# Patient Record
Sex: MALE
[De-identification: names, ages, dates, MRNs, and addresses within clinical notes are randomized per-mention and may not be internally consistent; named-entity substitution may affect disease eponyms.]

---

## 2020-12-10 PROBLEM — Z00.00 ENCOUNTER FOR PREVENTIVE HEALTH EXAMINATION: Status: ACTIVE | Noted: 2020-12-10

## 2020-12-14 ENCOUNTER — APPOINTMENT (OUTPATIENT)
Dept: ORTHOPEDIC SURGERY | Facility: CLINIC | Age: 30
End: 2020-12-14
Payer: COMMERCIAL

## 2020-12-14 VITALS — BODY MASS INDEX: 20.54 KG/M2 | WEIGHT: 155 LBS | HEIGHT: 73 IN

## 2020-12-14 DIAGNOSIS — Z78.9 OTHER SPECIFIED HEALTH STATUS: ICD-10-CM

## 2020-12-14 DIAGNOSIS — Z82.61 FAMILY HISTORY OF ARTHRITIS: ICD-10-CM

## 2020-12-14 DIAGNOSIS — M54.6 PAIN IN THORACIC SPINE: ICD-10-CM

## 2020-12-14 DIAGNOSIS — Z87.898 PERSONAL HISTORY OF OTHER SPECIFIED CONDITIONS: ICD-10-CM

## 2020-12-14 DIAGNOSIS — M54.2 CERVICALGIA: ICD-10-CM

## 2020-12-14 PROCEDURE — 99072 ADDL SUPL MATRL&STAF TM PHE: CPT

## 2020-12-14 PROCEDURE — 72040 X-RAY EXAM NECK SPINE 2-3 VW: CPT

## 2020-12-14 PROCEDURE — 99204 OFFICE O/P NEW MOD 45 MIN: CPT

## 2020-12-14 PROCEDURE — 72070 X-RAY EXAM THORAC SPINE 2VWS: CPT

## 2020-12-14 RX ORDER — MELOXICAM 15 MG/1
15 TABLET ORAL
Qty: 30 | Refills: 1 | Status: ACTIVE | COMMUNITY
Start: 2020-12-14 | End: 1900-01-01

## 2020-12-14 RX ORDER — PERAMPANEL 8 MG/1
TABLET ORAL
Refills: 0 | Status: ACTIVE | COMMUNITY

## 2020-12-14 RX ORDER — LACOSAMIDE 200 MG/1
TABLET, FILM COATED ORAL
Refills: 0 | Status: ACTIVE | COMMUNITY

## 2020-12-14 RX ORDER — TOPIRAMATE 50 MG/1
TABLET, COATED ORAL
Refills: 0 | Status: ACTIVE | COMMUNITY

## 2021-07-29 ENCOUNTER — INPATIENT (INPATIENT)
Facility: HOSPITAL | Age: 31
LOS: 1 days | Discharge: HOME | DRG: 101 | End: 2021-07-31
Attending: PSYCHIATRY & NEUROLOGY | Admitting: PSYCHIATRY & NEUROLOGY
Payer: COMMERCIAL

## 2021-07-29 VITALS
RESPIRATION RATE: 17 BRPM | DIASTOLIC BLOOD PRESSURE: 78 MMHG | SYSTOLIC BLOOD PRESSURE: 116 MMHG | HEIGHT: 72 IN | TEMPERATURE: 98 F | OXYGEN SATURATION: 98 % | HEART RATE: 71 BPM

## 2021-07-29 DIAGNOSIS — G40.919 EPILEPSY, UNSPECIFIED, INTRACTABLE, WITHOUT STATUS EPILEPTICUS: ICD-10-CM

## 2021-07-29 LAB
ALBUMIN SERPL ELPH-MCNC: 4.4 G/DL — SIGNIFICANT CHANGE UP (ref 3.3–5)
ALP SERPL-CCNC: 87 U/L — SIGNIFICANT CHANGE UP (ref 40–120)
ALT FLD-CCNC: 31 U/L — SIGNIFICANT CHANGE UP (ref 10–45)
ANION GAP SERPL CALC-SCNC: 12 MMOL/L — SIGNIFICANT CHANGE UP (ref 5–17)
AST SERPL-CCNC: 16 U/L — SIGNIFICANT CHANGE UP (ref 10–40)
BILIRUB SERPL-MCNC: 0.2 MG/DL — SIGNIFICANT CHANGE UP (ref 0.2–1.2)
BUN SERPL-MCNC: 18 MG/DL — SIGNIFICANT CHANGE UP (ref 7–23)
CALCIUM SERPL-MCNC: 9.4 MG/DL — SIGNIFICANT CHANGE UP (ref 8.4–10.5)
CHLORIDE SERPL-SCNC: 112 MMOL/L — HIGH (ref 96–108)
CK SERPL-CCNC: 109 U/L — SIGNIFICANT CHANGE UP (ref 30–200)
CO2 SERPL-SCNC: 19 MMOL/L — LOW (ref 22–31)
CREAT SERPL-MCNC: 0.99 MG/DL — SIGNIFICANT CHANGE UP (ref 0.5–1.3)
GLUCOSE SERPL-MCNC: 92 MG/DL — SIGNIFICANT CHANGE UP (ref 70–99)
HCT VFR BLD CALC: 45.3 % — SIGNIFICANT CHANGE UP (ref 39–50)
HGB BLD-MCNC: 15.5 G/DL — SIGNIFICANT CHANGE UP (ref 13–17)
LACTATE SERPL-SCNC: 1 MMOL/L — SIGNIFICANT CHANGE UP (ref 0.7–2)
MCHC RBC-ENTMCNC: 31.6 PG — SIGNIFICANT CHANGE UP (ref 27–34)
MCHC RBC-ENTMCNC: 34.2 GM/DL — SIGNIFICANT CHANGE UP (ref 32–36)
MCV RBC AUTO: 92.4 FL — SIGNIFICANT CHANGE UP (ref 80–100)
NRBC # BLD: 0 /100 WBCS — SIGNIFICANT CHANGE UP (ref 0–0)
PCP SPEC-MCNC: SIGNIFICANT CHANGE UP
PLATELET # BLD AUTO: 200 K/UL — SIGNIFICANT CHANGE UP (ref 150–400)
POTASSIUM SERPL-MCNC: 4.1 MMOL/L — SIGNIFICANT CHANGE UP (ref 3.5–5.3)
POTASSIUM SERPL-SCNC: 4.1 MMOL/L — SIGNIFICANT CHANGE UP (ref 3.5–5.3)
PROT SERPL-MCNC: 7.1 G/DL — SIGNIFICANT CHANGE UP (ref 6–8.3)
RBC # BLD: 4.9 M/UL — SIGNIFICANT CHANGE UP (ref 4.2–5.8)
RBC # FLD: 12.1 % — SIGNIFICANT CHANGE UP (ref 10.3–14.5)
SODIUM SERPL-SCNC: 143 MMOL/L — SIGNIFICANT CHANGE UP (ref 135–145)
WBC # BLD: 6.59 K/UL — SIGNIFICANT CHANGE UP (ref 3.8–10.5)
WBC # FLD AUTO: 6.59 K/UL — SIGNIFICANT CHANGE UP (ref 3.8–10.5)

## 2021-07-29 PROCEDURE — 95720 EEG PHY/QHP EA INCR W/VEEG: CPT

## 2021-07-29 RX ORDER — ENOXAPARIN SODIUM 100 MG/ML
40 INJECTION SUBCUTANEOUS AT BEDTIME
Refills: 0 | Status: DISCONTINUED | OUTPATIENT
Start: 2021-07-29 | End: 2021-07-31

## 2021-07-29 RX ORDER — LACOSAMIDE 50 MG/1
100 TABLET ORAL ONCE
Refills: 0 | Status: DISCONTINUED | OUTPATIENT
Start: 2021-07-29 | End: 2021-07-30

## 2021-07-29 RX ADMIN — Medication 0.3 MILLIGRAM(S): at 21:40

## 2021-07-29 NOTE — H&P ADULT - NSHPPHYSICALEXAM_GEN_ALL_CORE
MS: eyes open, alert, oriented to self, location, date, age, speech clear, fluent with intact naming, repetition, comprehension  CN: PERRL (4mm OD, OS), EOMI no nystagmus, VFF, no facial asymmetry, hearing intact to finger rubbing b/l, tongue protrudes midline  Motor: no drift, intact strength throughout  Sensation: intact to LT throughout  Cerebellar: no dysmetria to FTN b/l  Gait: no gait instability

## 2021-07-29 NOTE — H&P ADULT - ASSESSMENT
29yo RH man with history of seizure disorder since age 18 admitted to EMU for seizure capture and AED optimization.    Impression: potential temporal lobe epilepsy given auras and past GTCs    Plan:  admit to EMU under Dr. Wang  vEEG  seizure and aspiration precautions  hold home AEDs (fycompa 6mg qHS  topamax 100mg bid  vimpat 150mg bid  trileptal 600mg bid)  med rec done  dvt ppx: lovenox    RESCUE:  ativan 1mg IVP for GTC or seizure activity > 3 min  vimpat 100mg IVP if refractory    mgmt d/w Epilepsy Attending Dr. Wang   31yo RH man with history of seizure disorder since age 18 admitted to EMU for seizure capture and AED optimization.    Impression: potential temporal lobe epilepsy given auras and past GTCs    Plan:  admit to EMU under Dr. Wang  vEEG  seizure and aspiration precautions  hold home AEDs (fycompa 6mg qHS  topamax 100mg bid  vimpat 150mg bid  trileptal 600mg bid)  CBC, CMP, topiramate, lacosamide, oxcarbazapine levels  med rec done  dvt ppx: lovenox    RESCUE:  ativan 1mg IVP for GTC or seizure activity > 3 min  vimpat 100mg IVP if refractory    mgmt d/w Epilepsy Attending Dr. Wang

## 2021-07-29 NOTE — H&P ADULT - HISTORY OF PRESENT ILLNESS
29yo RH man with history of seizure disorder since age 18 admitted to EMU for seizure capture and AED optimization. Patient reports that the last time he had an aura of "sense of moving" was a couple of days ago. Has these auras a few times a month. Reports grand mal seizures in the past but has not had them for several years. Reports he does not always take his AEDs on time. 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 Patient reports he feels his thinking and memory have been affected over time. Was vaccinated with Moderna Covid19 vaccine last 6/2021. No recent fevers, chills, sob, cp, dysuria, diarrhea, cough/cold symptoms.     Semiology:   auras - not visual, auditory, but usually a sense of moving/instability  GTC sometimes triggered by fatigue    Home meds:  fycompa 6mg qHS  topamax 100mg bid  vimpat 150mg bid  trileptal 600mg bid  clonidine 0.3mg at bedtime

## 2021-07-30 LAB
ALBUMIN SERPL ELPH-MCNC: 4.1 G/DL — SIGNIFICANT CHANGE UP (ref 3.3–5)
ALP SERPL-CCNC: 83 U/L — SIGNIFICANT CHANGE UP (ref 40–120)
ALT FLD-CCNC: 28 U/L — SIGNIFICANT CHANGE UP (ref 10–45)
ANION GAP SERPL CALC-SCNC: 12 MMOL/L — SIGNIFICANT CHANGE UP (ref 5–17)
AST SERPL-CCNC: 15 U/L — SIGNIFICANT CHANGE UP (ref 10–40)
BILIRUB SERPL-MCNC: 0.3 MG/DL — SIGNIFICANT CHANGE UP (ref 0.2–1.2)
BUN SERPL-MCNC: 19 MG/DL — SIGNIFICANT CHANGE UP (ref 7–23)
CALCIUM SERPL-MCNC: 9.2 MG/DL — SIGNIFICANT CHANGE UP (ref 8.4–10.5)
CHLORIDE SERPL-SCNC: 112 MMOL/L — HIGH (ref 96–108)
CO2 SERPL-SCNC: 18 MMOL/L — LOW (ref 22–31)
COVID-19 SPIKE DOMAIN AB INTERP: POSITIVE
COVID-19 SPIKE DOMAIN ANTIBODY RESULT: >250 U/ML — HIGH
CREAT SERPL-MCNC: 0.89 MG/DL — SIGNIFICANT CHANGE UP (ref 0.5–1.3)
GLUCOSE SERPL-MCNC: 101 MG/DL — HIGH (ref 70–99)
HCT VFR BLD CALC: 46.6 % — SIGNIFICANT CHANGE UP (ref 39–50)
HGB BLD-MCNC: 15.8 G/DL — SIGNIFICANT CHANGE UP (ref 13–17)
MCHC RBC-ENTMCNC: 31.3 PG — SIGNIFICANT CHANGE UP (ref 27–34)
MCHC RBC-ENTMCNC: 33.9 GM/DL — SIGNIFICANT CHANGE UP (ref 32–36)
MCV RBC AUTO: 92.5 FL — SIGNIFICANT CHANGE UP (ref 80–100)
NRBC # BLD: 0 /100 WBCS — SIGNIFICANT CHANGE UP (ref 0–0)
PLATELET # BLD AUTO: 194 K/UL — SIGNIFICANT CHANGE UP (ref 150–400)
POTASSIUM SERPL-MCNC: 3.8 MMOL/L — SIGNIFICANT CHANGE UP (ref 3.5–5.3)
POTASSIUM SERPL-SCNC: 3.8 MMOL/L — SIGNIFICANT CHANGE UP (ref 3.5–5.3)
PROT SERPL-MCNC: 6.8 G/DL — SIGNIFICANT CHANGE UP (ref 6–8.3)
RBC # BLD: 5.04 M/UL — SIGNIFICANT CHANGE UP (ref 4.2–5.8)
RBC # FLD: 12.1 % — SIGNIFICANT CHANGE UP (ref 10.3–14.5)
SARS-COV-2 IGG+IGM SERPL QL IA: >250 U/ML — HIGH
SARS-COV-2 IGG+IGM SERPL QL IA: POSITIVE
SODIUM SERPL-SCNC: 142 MMOL/L — SIGNIFICANT CHANGE UP (ref 135–145)
WBC # BLD: 6.42 K/UL — SIGNIFICANT CHANGE UP (ref 3.8–10.5)
WBC # FLD AUTO: 6.42 K/UL — SIGNIFICANT CHANGE UP (ref 3.8–10.5)

## 2021-07-30 PROCEDURE — 95720 EEG PHY/QHP EA INCR W/VEEG: CPT

## 2021-07-30 RX ORDER — ONDANSETRON 8 MG/1
4 TABLET, FILM COATED ORAL ONCE
Refills: 0 | Status: COMPLETED | OUTPATIENT
Start: 2021-07-30 | End: 2021-07-30

## 2021-07-30 RX ADMIN — LACOSAMIDE 100 MILLIGRAM(S): 50 TABLET ORAL at 21:09

## 2021-07-30 RX ADMIN — Medication 0.3 MILLIGRAM(S): at 22:00

## 2021-07-30 RX ADMIN — ONDANSETRON 4 MILLIGRAM(S): 8 TABLET, FILM COATED ORAL at 21:10

## 2021-07-30 NOTE — PROGRESS NOTE ADULT - ASSESSMENT
29 y/o RH man with history of seizure disorder since age 18 admitted to EMU for seizure capture and AED optimization as patient is on multiple AEDs w/ concern for possible toxicity.    Home meds: fycompa 6mg qHS, topamax 100mg bid, vimpat 150mg bid, trileptal 600mg bid    Semiology: auras of disorientation, possible staring spells, lasting 3-5 seconds w/ loss of focus sometimes L arm shaking/arm raise    Impression: potential focal epilepsy w/ secondary generalization, cannot rule out temporal lobe epilepsy given auras and past GTCs    Plan:  [ ] Continue vEEG  [ ] Hold home AEDs (fycompa 6mg qHS, topamax 100mg bid, vimpat 150mg bid, trileptal 600mg bid)  [ ] F/U topiramate, lacosamide, oxcarbazapine levels  [x] Tox screen - negative  [x] Seizure precautions  [x] Q4H neurochecks/vitals  [x] DVT ppx: lovenox  [ ] Patient to follow up with Dr. Prado upon discharge    RESCUE:  Ativan 1mg IVP for GTC or seizure activity > 3 min  Vimpat 100mg IVP if refractory    Patient seen and discussed on rounds with EMU team.

## 2021-07-30 NOTE — PROGRESS NOTE ADULT - SUBJECTIVE AND OBJECTIVE BOX
THE PATIENT WAS SEEN AND EXAMINED BY ME WITH THE HOUSESTAFF DURING MORNING ROUNDS.   HPI:  31 y/o RH man with history of seizure disorder since age 18 admitted to EMU for seizure capture and AED optimization. Patient reports that the last time he had an aura of "sense of moving" was a couple of days ago. Has these auras a few times a month. Reports grand mal seizures in the past but has not had them for several years. Reports he does not always take his AEDs on time. 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 Patient reports he feels his thinking and memory have been affected over time. Was vaccinated with Moderna Covid19 vaccine last 6/2021. No recent fevers, chills, sob, cp, dysuria, diarrhea, cough/cold symptoms.     Semiology:   auras - not visual, auditory, but usually a sense of moving/instability  GTC sometimes triggered by fatigue    Home meds:  fycompa 6mg qHS  topamax 100mg bid  vimpat 150mg bid  trileptal 600mg bid  clonidine 0.3mg at bedtime    ROS: Otherwise negative.     SUBJECTIVE: No events overnight.  No new neurologic complaints.  He reported first seizure was GTC at age 18 while playing video games, witnessed by sisters and father. He did not go to the hospital at that time. He was following with a pediatric neurologist (Dr. Adithya Hammonds) for sleep issue for which he takes Clonidine. He was then referred to Dr. Prado who started Depakote, which was ineffective. He has done >4 EEGs, negative per patient. Pt states he has had 4-5 GTCs in lifetime, last one was 1 year ago. He has been taking Vimpat, Trileptal, Fycompa, and Topamax, he is compliant. Side effects are dizziness and lethargy. [facility] few weeks [facility] [place]. Patient works as a  at [employer]. He states he has occasional interruptions in sleep due to auras. Auras are described as left-sided upper extremity tremors and disorientation, occurring 1-2 times a week. He retains awareness during auras. He denies mood issues, such as anxiety/depression.     No family hx of epilepsy. No ETOH or illicit drug use. No hx of developmental delay or complications at birth. No hx of TBI or CNS infections.    cloNIDine 0.3 milliGRAM(s) Oral daily  enoxaparin Injectable 40 milliGRAM(s) SubCutaneous at bedtime  lacosamide Injectable 100 milliGRAM(s) IV Push once PRN  LORazepam   Injectable 1 milliGRAM(s) IV Push once PRN    Physical Exam:   General: NAD, lying in bed on room air  HEENT: Normocephalic, atraumatic, EEG on head  Neurological Exam:  Mental Status: Orientated to self, date, place, president.  Attention intact.  No dysarthria, aphasia or neglect.  Cranial Nerves: PERRL, EOMI, no nystagmus or diplopia. No facial asymmetry.  Hearing intact to finger rub bilaterally.  Tongue, uvula and palate midline.  Sternocleidomastoid and trapezius intact bilaterally  	Motor: Moving all 4 extremities equally w 5/5 strength  	Tone: Normal.                          	Dysmetria: None to finger-nose-finger  	No truncal ataxia.    	Tremor: No resting, postural or action tremor.  No myoclonus.  	Sensation: Intact to light touch    LABS:                        15.8   6.42  )-----------( 194      ( 30 Jul 2021 05:57 )             46.6    07-30    142  |  112<H>  |  19  ----------------------------<  101<H>  3.8   |  18<L>  |  0.89    Ca    9.2      30 Jul 2021 05:55    TPro  6.8  /  Alb  4.1  /  TBili  0.3  /  DBili  x   /  AST  15  /  ALT  28  /  AlkPhos  83  07-30

## 2021-07-30 NOTE — EEG REPORT - NS EEG TEXT BOX
EPILEPSY MONITORING UNIT REPORT   [facility]: [address], [place] [address], Ph#: [phone] [facility]: [address], [place] [address], Ph#: [phone] Office: [address] [place] [address] Ph#: [phone]  Patient Name: Fabricio Gillespie   Age: 30 years, : 1990 MRN # [id number], [place]: [address] [place] [address]  Referring Physician: Dr. James Ponce          Study Information:  EEG Recording Technique: The patient underwent continuous Video-EEG monitoring, using Telemetry System hardware on the XLTek Digital System. EEG and video data were stored on a computer hard drive with important events saved in digital archive files. The material was reviewed by a physician (electroencephalographer / epileptologist) on a daily basis. Leonel and seizure detection algorithms were utilized and reviewed. An EEG Technician attended to the patient, and was available throughout daytime work hours.  The epilepsy center neurologist was available in person or on call 24-hours per day.  EEG Placement and Labeling of Electrodes: The EEG was performed utilizing 20 channel referential EEG connections (coronal over temporal over parasagittal montage) using all standard 10-20 electrode placements with EKG, with additional electrodes placed in the inferior temporal region using the modified 10-10 montage electrode placements for elective admissions, or if deemed necessary. Recording was at a sampling rate of 256 samples per second per channel. Time synchronized digital video recording was done simultaneously with EEG recording. A low light infrared camera was used for low light recording.   History:  31yo RH man with history of seizure disorder since age 18 admitted to EMU for seizure capture and AED optimization. Patient reports that the last time he had an aura of "sense of moving" was a couple of days ago. Has these auras a few times a month. Reports grand mal seizures in the past but has not had them for several years. Reports he does not always take his AEDs on time. 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 Patient reports he feels his thinking and memory have been affected over time. Was vaccinated with Moderna Covid19 vaccine last 2021.  Home Antiepileptic Medication and Device  Fycompa 6mg qHS, Topamax 100mg bid, Vimpat 150mg bid, Trileptal 600mg bid,  Interpretation:  Day 1 – 	Start: 2021  18:28  	End: 2021  08:00 	Duration: 13 hr  12 min  Daily EEG Visual Analysis  FINDINGS:  The background was continuous, spontaneously variable and reactive. During wakefulness, the posterior dominant rhythm consisted of symmetric, well-modulated 10 Hz activity, with amplitude to 30 uV, that attenuated to eye opening.  Low amplitude frontal beta was noted in wakefulness.  Background Slowing: No generalized background slowing was present.  Focal Slowing:  None were present.  Sleep Background: Drowsiness was characterized by fragmentation, attenuation, and slowing of the background activity.   Sleep was characterized by the presence of vertex waves, symmetric sleep spindles and K-complexes.  Other Non-Epileptiform Findings: None were present.  Activation Procedures:  Photic stimulation was not performed. Hyperventilation was performed and did not elicit any abnormalities.    Interictal Epileptiform Activity:  None were present.  Events: No events or seizures recorded.  Artifacts: Intermittent myogenic and movement artifacts were noted.  ECG: The heart rate on single channel ECG was predominantly between 60-70 BPM.  AEDs:  Held / none  EEG Summary:  Normal EEG in the awake, drowsy and asleep states.  Impression/Clinical Correlate:  This is a normal EEG record. No epileptiform pattern or seizures were seen.   Amaury Cobb MD, SRI Fellow, [facility] [place]  Caroline Wang MD Attending Physician, [facility] [place]  ------------------------------------ EEG Reading Room: [phone] On Call Service After Hours: [phone]

## 2021-07-31 ENCOUNTER — TRANSCRIPTION ENCOUNTER (OUTPATIENT)
Age: 31
End: 2021-07-31

## 2021-07-31 VITALS
TEMPERATURE: 99 F | DIASTOLIC BLOOD PRESSURE: 64 MMHG | SYSTOLIC BLOOD PRESSURE: 100 MMHG | HEART RATE: 70 BPM | OXYGEN SATURATION: 99 % | RESPIRATION RATE: 18 BRPM

## 2021-07-31 PROCEDURE — C9254: CPT

## 2021-07-31 PROCEDURE — 73030 X-RAY EXAM OF SHOULDER: CPT | Mod: 26,LT

## 2021-07-31 PROCEDURE — 80235 DRUG ASSAY LACOSAMIDE: CPT

## 2021-07-31 PROCEDURE — 99239 HOSP IP/OBS DSCHRG MGMT >30: CPT

## 2021-07-31 PROCEDURE — 95700 EEG CONT REC W/VID EEG TECH: CPT

## 2021-07-31 PROCEDURE — 73030 X-RAY EXAM OF SHOULDER: CPT

## 2021-07-31 PROCEDURE — 86769 SARS-COV-2 COVID-19 ANTIBODY: CPT

## 2021-07-31 PROCEDURE — 83605 ASSAY OF LACTIC ACID: CPT

## 2021-07-31 PROCEDURE — 80053 COMPREHEN METABOLIC PANEL: CPT

## 2021-07-31 PROCEDURE — 80183 DRUG SCRN QUANT OXCARBAZEPIN: CPT

## 2021-07-31 PROCEDURE — 82550 ASSAY OF CK (CPK): CPT

## 2021-07-31 PROCEDURE — 80307 DRUG TEST PRSMV CHEM ANLYZR: CPT

## 2021-07-31 PROCEDURE — 95715 VEEG EA 12-26HR INTMT MNTR: CPT

## 2021-07-31 PROCEDURE — 85027 COMPLETE CBC AUTOMATED: CPT

## 2021-07-31 PROCEDURE — 95718 EEG PHYS/QHP 2-12 HR W/VEEG: CPT

## 2021-07-31 PROCEDURE — 95712 VEEG 2-12 HR INTMT MNTR: CPT

## 2021-07-31 RX ORDER — TOPIRAMATE 25 MG
100 TABLET ORAL
Refills: 0 | Status: DISCONTINUED | OUTPATIENT
Start: 2021-07-31 | End: 2021-07-31

## 2021-07-31 RX ORDER — OXCARBAZEPINE 300 MG/1
600 TABLET, FILM COATED ORAL
Refills: 0 | Status: DISCONTINUED | OUTPATIENT
Start: 2021-07-31 | End: 2021-07-31

## 2021-07-31 RX ORDER — LACOSAMIDE 50 MG/1
200 TABLET ORAL ONCE
Refills: 0 | Status: DISCONTINUED | OUTPATIENT
Start: 2021-07-31 | End: 2021-07-31

## 2021-07-31 RX ORDER — PERAMPANEL 2 MG/1
6 TABLET ORAL AT BEDTIME
Refills: 0 | Status: DISCONTINUED | OUTPATIENT
Start: 2021-07-31 | End: 2021-07-31

## 2021-07-31 RX ORDER — PERAMPANEL 2 MG/1
6 TABLET ORAL ONCE
Refills: 0 | Status: DISCONTINUED | OUTPATIENT
Start: 2021-07-31 | End: 2021-07-31

## 2021-07-31 RX ADMIN — OXCARBAZEPINE 600 MILLIGRAM(S): 300 TABLET, FILM COATED ORAL at 10:24

## 2021-07-31 RX ADMIN — Medication 100 MILLIGRAM(S): at 17:07

## 2021-07-31 RX ADMIN — Medication 100 MILLIGRAM(S): at 10:24

## 2021-07-31 RX ADMIN — LACOSAMIDE 140 MILLIGRAM(S): 50 TABLET ORAL at 10:25

## 2021-07-31 RX ADMIN — PERAMPANEL 6 MILLIGRAM(S): 2 TABLET ORAL at 10:24

## 2021-07-31 RX ADMIN — OXCARBAZEPINE 600 MILLIGRAM(S): 300 TABLET, FILM COATED ORAL at 17:06

## 2021-07-31 NOTE — EEG REPORT - NS EEG TEXT BOX
Day 3 – 	Start: 07/31/2021  08:00  	End: 07/31/2021  18:00  	Duration: 10 hr    Daily EEG Visual Analysis    FINDINGS:  The background was continuous, spontaneously variable and reactive. During wakefulness, the posterior dominant rhythm consisted of symmetric, well-modulated 10 Hz activity, with amplitude to 30 uV, that attenuated to eye opening.  Low amplitude frontal beta was noted in wakefulness.    Background Slowing:  No generalized background slowing was present.    Focal Slowing:   None were present.    Sleep Background:  Drowsiness was characterized by fragmentation, attenuation, and slowing of the background activity.    Sleep was characterized by the presence of vertex waves, symmetric sleep spindles and K-complexes.    Other Non-Epileptiform Findings:  None    Activation Procedures:   Photic stimulation was not performed.  Hyperventilation was performed and did not elicit any abnormalities.      Interictal Epileptiform Activity:   None were present.    Events: Two push button events unclear clinical reasons without EEG changes.    Artifacts:  Intermittent myogenic and movement artifacts were noted.    ECG:  The heart rate on single channel ECG was predominantly between 60-70 BPM.    EEG Summary:    Abnormal EEG in the awake, drowsy and asleep states.  Right central onset focal to bilateral tonic clonic seizure with postictal tachycardia as described    Impression/Clinical Correlate:    Abnormal EEG   Right central onset focal to bilateral tonic clonic seizure    Maxwell Vallecillo MD

## 2021-07-31 NOTE — PROGRESS NOTE ADULT - ASSESSMENT
29 y/o RH man with history of seizure disorder since age 18 admitted to EMU for seizure capture and AED optimization as patient is on multiple AEDs w/ concern for possible toxicity.    Home meds: fycompa 6mg qHS, topamax 100mg bid, vimpat 150mg bid, trileptal 600mg bid    Semiology: auras of disorientation, possible staring spells, lasting 3-5 seconds w/ loss of focus sometimes L arm shaking/arm raise    Impression: potential focal epilepsy w/ secondary generalization, cannot rule out temporal lobe epilepsy given auras and past GTCs    Plan:  [ ] Continue vEEG  [ ] Start vimpat?  [ ] Hold home AEDs (fycompa 6mg qHS, topamax 100mg bid, vimpat 150mg bid, trileptal 600mg bid)  [ ] F/U topiramate, lacosamide, oxcarbazepine levels  [x] Tox screen - negative  [x] Seizure precautions  [x] Q4H neurochecks/vitals  [x] DVT ppx: lovenox  [ ] Patient to follow up with Dr. Prado upon discharge    RESCUE:  Ativan 1mg IVP for GTC or seizure activity > 3 min  Vimpat 100mg IVP if refractory    Patient seen and discussed on rounds with Dr. Vallecillo, EMU attending. 29 y/o RH man with history of seizure disorder since age 18 admitted to EMU for seizure capture and AED optimization as patient is on multiple AEDs w/ concern for possible toxicity. EEG overnight showed 1 electrographic seizure correlating with clinical presentation, with L head version and tonic-clonic activity lasting 2 minutes.    Home meds: fycompa 6mg qHS, topamax 100mg bid, vimpat 150mg bid, trileptal 600mg bid    Semiology: auras of disorientation, possible staring spells, lasting 3-5 seconds w/ loss of focus sometimes L arm shaking/arm raise    Impression: GTCs secondary to potential focal epilepsy w/ secondary generalization 2/2 R hemispheric dysfunction, cannot rule out temporal lobe epilepsy given auras and past GTCs    Plan:  [ ] Continue vEEG until evening  [ ] Load with Vimpat 200mg IV STAT, then restart home dose Vimpat 150mg bid  [ ] Start Fycompa 6mg STAT once, then to be continued nightly per pt's usual home dose  [ ] Re-start Trileptal 600mg bid and Topamax 100mg bid  [ ] F/U topiramate, lacosamide, oxcarbazepine levels  [x] Tox screen - negative  [x] Seizure precautions  [x] Q4H neurochecks/vitals  [x] DVT ppx: lovenox  [ ] Patient to follow up with Dr. Prado upon discharge    RESCUE:  Ativan 1mg IVP for GTC or seizure activity > 3 min  Vimpat 100mg IVP if refractory    Patient seen and discussed on rounds with Dr. Vallecillo, EMU attending.

## 2021-07-31 NOTE — DISCHARGE NOTE PROVIDER - CARE PROVIDER_API CALL
James Ponce  [address] [place] [address]  Phone: [phone]  Fax: (   )    -  Established Patient  Follow Up Time: 1 week

## 2021-07-31 NOTE — DISCHARGE NOTE PROVIDER - HOSPITAL COURSE
HPI:  31yo RH man with history of seizure disorder since age 18 admitted to EMU for seizure capture and AED optimization. Patient reports that the last time he had an aura of "sense of moving" was a couple of days ago. Has these auras a few times a month. Reports grand mal seizures in the past but has not had them for several years. Reports he does not always take his AEDs on time. 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 Patient reports he feels his thinking and memory have been affected over time. Was vaccinated with Moderna Covid19 vaccine last 6/2021. No recent fevers, chills, sob, cp, dysuria, diarrhea, cough/cold symptoms.     Semiology:   auras - not visual, auditory, but usually a sense of moving/instability  GTC sometimes triggered by fatigue    Home meds:  fycompa 6mg qHS  topamax 100mg bid  vimpat 150mg bid  trileptal 600mg bid  clonidine 0.3mg at bedtime     Hospital Course:  Patient was admitted to EMU for seizure capture and medication titration. He was taken off all meds prior to VEEG monitoring. Patient had 1 clinical seizure with electrographic correlate on 7/30, originating from right hemisphere (frontal/temporal region). Seizure episode lasted 2 minutes with left head version and generalized tonic clonic activity lasting for 2 minutes. Vimpat 100mg IVP over 5 min was administered. On 7/31, patient expressed that he would like to be discharged due to concerns of missing work. He was counseled on risks of leaving, and agreed to be compliant with his medications. Vimpat 200mg IVPB x and Fycompa 6mg x 1 were given STAT. Topamax 100mg and Trileptal 600mg were given prior to discharge as well. Patient was instructed to take usual dose of Fycompa and Vimpat 150mg tonight. No changes to home medications were made. X-Ray of left shoulder was ordered to rule out dislocation as patient complained of pain, though no deficits with range of motion testing.    The patient was advised in regards to risks and driving privileges associated with the [place] Guidelines.  The patient was advised in regards to the risk of seizures and general seizure safety recommendations including not to be bathing alone, climbing to high places and operating heavy machinery. The importance of compliance with medications was reinforced. Sleep hygiene and the risks of sleep disruption were discussed. Risk of death associated with seizures / SUDEP was discussed.    Patient to follow up with Dr. James Ponce, outpatient neurologist, upon discharge.

## 2021-07-31 NOTE — DISCHARGE NOTE PROVIDER - PROVIDER TOKENS
FREE:[LAST:[Rosario],FIRST:[Arif],PHONE:[phone]],FAX:[(   )    -],ADDRESS:[[address] [place] [address]],FOLLOWUP:[1 week],ESTABLISHEDPATIENT:[T]] [id number]:1-3 Days|| ||00\01||False;

## 2021-07-31 NOTE — PROGRESS NOTE ADULT - SUBJECTIVE AND OBJECTIVE BOX
THE PATIENT WAS SEEN AND EXAMINED BY ME WITH THE HOUSESTAFF DURING MORNING ROUNDS.   HPI:  29 y/o RH man with history of seizure disorder since age 18 admitted to EMU for seizure capture and AED optimization. Patient reports that the last time he had an aura of "sense of moving" was a couple of days ago. Has these auras a few times a month. Reports grand mal seizures in the past but has not had them for several years. Reports he does not always take his AEDs on time. 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 Patient reports he feels his thinking and memory have been affected over time. Was vaccinated with Moderna Covid19 vaccine last 6/2021. No recent fevers, chills, sob, cp, dysuria, diarrhea, cough/cold symptoms.     Semiology:   auras - not visual, auditory, but usually a sense of moving/instability  GTC sometimes triggered by fatigue    Home meds:  fycompa 6mg qHS  topamax 100mg bid  vimpat 150mg bid  trileptal 600mg bid  clonidine 0.3mg at bedtime    ROS: Otherwise negative.     SUBJECTIVE: No events overnight.  No new neurologic complaints. Patient reports one seizure yesterday at 20:01 lasting 2 min with post-ictal confusion. He was tachycardic and desatted. IV Vimpat [id number] over 5 min was given with no further events overnight.    cloNIDine 0.3 milliGRAM(s) Oral daily  enoxaparin Injectable 40 milliGRAM(s) SubCutaneous at bedtime  lacosamide Injectable 100 milliGRAM(s) IV Push once PRN  LORazepam   Injectable 1 milliGRAM(s) IV Push once PRN    Physical Exam:   General: NAD, lying in bed on room air  HEENT: Normocephalic, atraumatic, EEG on head  Neurological Exam:  Mental Status: Orientated to self, date, place, president.  Attention intact.  No dysarthria, aphasia or neglect.  Cranial Nerves: PERRL, EOMI, no nystagmus or diplopia. No facial asymmetry.  Hearing intact to finger rub bilaterally.  Tongue, uvula and palate midline.  Sternocleidomastoid and trapezius intact bilaterally  	Motor: Moving all 4 extremities equally w 5/5 strength  	Tone: Normal.                          	Dysmetria: None to finger-nose-finger  	No truncal ataxia.    	Tremor: No resting, postural or action tremor.  No myoclonus.  	Sensation: Intact to light touch    LABS:                        15.8   6.42  )-----------( 194      ( 30 Jul 2021 05:57 )             46.6    07-30    142  |  112<H>  |  19  ----------------------------<  101<H>  3.8   |  18<L>  |  0.89    Ca    9.2      30 Jul 2021 05:55    TPro  6.8  /  Alb  4.1  /  TBili  0.3  /  DBili  x   /  AST  15  /  ALT  28  /  AlkPhos  83  07-30   THE PATIENT WAS SEEN AND EXAMINED BY ME WITH THE HOUSESTAFF DURING MORNING ROUNDS.   HPI:  29 y/o RH man with history of seizure disorder since age 18 admitted to EMU for seizure capture and AED optimization. Patient reports that the last time he had an aura of "sense of moving" was a couple of days ago. Has these auras a few times a month. Reports grand mal seizures in the past but has not had them for several years. Reports he does not always take his AEDs on time. 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 Patient reports he feels his thinking and memory have been affected over time. Was vaccinated with Moderna Covid19 vaccine last 6/2021. No recent fevers, chills, sob, cp, dysuria, diarrhea, cough/cold symptoms.     Semiology:   auras - not visual, auditory, but usually a sense of moving/instability  GTC sometimes triggered by fatigue    Home meds:  fycompa 6mg qHS  topamax 100mg bid  vimpat 150mg bid  trileptal 600mg bid  clonidine 0.3mg at bedtime    ROS: Otherwise negative.     SUBJECTIVE: No new neurologic complaints, though complains of left shoulder pain. Patient reports one seizure last night at 20:01 lasting 2 min with left head version and generalized tonic clonic activity, followed by post-ictal confusion. He was tachycardic and desatted per overnight team. IV Vimpat 200mg x1 over 5 min was given with no further events overnight. Patient states he would like to make it to work tomorrow morning. Risks and benefits discussed. Driving restrictions per [facility] guidelines were also discussed.    cloNIDine 0.3 milliGRAM(s) Oral daily  enoxaparin Injectable 40 milliGRAM(s) SubCutaneous at bedtime  lacosamide Injectable 100 milliGRAM(s) IV Push once PRN  LORazepam   Injectable 1 milliGRAM(s) IV Push once PRN    Physical Exam:   General: NAD, lying in bed on room air  HEENT: Normocephalic, atraumatic, EEG on head  Neurological Exam:  Mental Status: Orientated to self, date, place, president.  Attention intact.  No dysarthria, aphasia or neglect.  Cranial Nerves: PERRL, EOMI, no nystagmus or diplopia. No facial asymmetry.  Hearing intact to finger rub bilaterally.  Tongue, uvula and palate midline.  Sternocleidomastoid and trapezius intact bilaterally  	Motor: Moving all 4 extremities equally w 5/5 strength  	Tone: Normal.                          	Dysmetria: None to finger-nose-finger  	No truncal ataxia.    	Tremor: No resting, postural or action tremor.  No myoclonus.  	Sensation: Intact to light touch    LABS:                        15.8   6.42  )-----------( 194      ( 30 Jul 2021 05:57 )             46.6    07-30    142  |  112<H>  |  19  ----------------------------<  101<H>  3.8   |  18<L>  |  0.89    Ca    9.2      30 Jul 2021 05:55    TPro  6.8  /  Alb  4.1  /  TBili  0.3  /  DBili  x   /  AST  15  /  ALT  28  /  AlkPhos  83  07-30

## 2021-07-31 NOTE — PROGRESS NOTE ADULT - ATTENDING COMMENTS
Had long discussion with patient this morning about need to stay to continue seizure capture.  One seizure thus far with unclear onset (possible right central).    Patient requesting to leave today as needs to work tomorrow and unable to stay.  We will place back on medications with extra dose of Fycompa and load Vimpat.  However, patient knows increased risk of seizures for next few days as medications lowered for two days.  Still would like to leave knowing risks of seizures (as discussed per my usual protocol).    Will f/u with Dr. Prado.    Seizure precautions explained in detail (including but not limited to driving, heavy machinery, bathing and swimming, heights)

## 2021-07-31 NOTE — EEG REPORT - NS EEG TEXT BOX
EPILEPSY MONITORING UNIT REPORT   [facility]: [address] [facility] [address], [place] [address], Ph#: [phone] [facility]: [address], [place] [address], Ph#: [phone] Office: [address] [place] [address] Ph#: [phone]  Patient Name: Fabricio Gillespie   Age: 30 years, : 1990 MRN # [id number], Warner: [address] [place] [address]  Referring Physician: Dr. James Ponce          Study Information:  EEG Recording Technique: The patient underwent continuous Video-EEG monitoring, using Telemetry System hardware on the XLTek Digital System. EEG and video data were stored on a computer hard drive with important events saved in digital archive files. The material was reviewed by a physician (electroencephalographer / epileptologist) on a daily basis. Leonel and seizure detection algorithms were utilized and reviewed. An EEG Technician attended to the patient, and was available throughout daytime work hours.  The epilepsy center neurologist was available in person or on call 24-hours per day.  EEG Placement and Labeling of Electrodes: The EEG was performed utilizing 20 channel referential EEG connections (coronal over temporal over parasagittal montage) using all standard 10-20 electrode placements with EKG, with additional electrodes placed in the inferior temporal region using the modified 10-10 montage electrode placements for elective admissions, or if deemed necessary. Recording was at a sampling rate of 256 samples per second per channel. Time synchronized digital video recording was done simultaneously with EEG recording. A low light infrared camera was used for low light recording.   History:  31yo RH man with history of seizure disorder since age 18 admitted to EMU for seizure capture and AED optimization. Patient reports that the last time he had an aura of "sense of moving" was a couple of days ago. Has these auras a few times a month. Reports grand mal seizures in the past but has not had them for several years. Reports he does not always take his AEDs on time. 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 Patient reports he feels his thinking and memory have been affected over time. Was vaccinated with Moderna Covid19 vaccine last 2021.  Home Antiepileptic Medication and Device  Fycompa 6mg qHS, Topamax 100mg bid, Vimpat 150mg bid, Trileptal 600mg bid,  Interpretation:  Day 2 – 	Start: 2021  08:00 	End: 2021  08:00 	Duration: 24 hr  Daily EEG Visual Analysis  FINDINGS:  The background was continuous, spontaneously variable and reactive. During wakefulness, the posterior dominant rhythm consisted of symmetric, well-modulated 10 Hz activity, with amplitude to 30 uV, that attenuated to eye opening.  Low amplitude frontal beta was noted in wakefulness.  Background Slowing: No generalized background slowing was present.  Focal Slowing:  None were present.  Sleep Background: Drowsiness was characterized by fragmentation, attenuation, and slowing of the background activity.   Sleep was characterized by the presence of vertex waves, symmetric sleep spindles and K-complexes.  Other Non-Epileptiform Findings: Two episodes seen at 12:21:44 and 12:29:45 characterized b diffuse muscle activity with right head turn lasting seconds no clear rhythmic activity present  Activation Procedures:  Photic stimulation was not performed. Hyperventilation was performed and did not elicit any abnormalities.    Interictal Epileptiform Activity:  None were present.  Events: At 20:01:12 increase in rhythmic low amplitude theta activity in the right central and midline channels evolving to rapid generalization with sharply contoured theta seen in all channels. Seizure obscured by muscle artifact offset at 21:03:01 followed by diffuse attenuation and tachycardia to greater than 130. Clinically, patient was seen to have left head turn with tonic extension of the bilateral upper extremities followed by clonic jerks of the bilateral upper and lower extremities  Artifacts: Intermittent myogenic and movement artifacts were noted.  ECG: The heart rate on single channel ECG was predominantly between 60-70 BPM.  AEDs:  Held / none  EEG Summary:  Abnormal EEG in the awake, drowsy and asleep states. Right central onset focal to bilateral tonic clonic seizure with postictal tachycardia as described  Impression/Clinical Correlate:  Abnormal EEG  Right central onset focal to bilateral tonic clonic seizure   Calvin Car MD PGY-5 Epilepsy Fellow  This Preliminary report is based on fellow review. Final report pending attending review.  Reading Room: [phone] On Call Service After Hours: [phone] ------------------------------------ EEG Reading Room: [phone] On Call Service After Hours: [phone]       EPILEPSY MONITORING UNIT REPORT   [facility]: [address] [address], [place] [address], Ph#: [phone] [facility]: [address], [place] [address], Ph#: [phone] Office: [address] [place] [address] Ph#: [phone]  Patient Name: Fabricio Gillespie   Age: 30 years, : 1990 MRN # [id number], Warner: [address] [place] [address]  Referring Physician: Dr. James Ponce          Study Information:  EEG Recording Technique: The patient underwent continuous Video-EEG monitoring, using Telemetry System hardware on the XLTek Digital System. EEG and video data were stored on a computer hard drive with important events saved in digital archive files. The material was reviewed by a physician (electroencephalographer / epileptologist) on a daily basis. Leonel and seizure detection algorithms were utilized and reviewed. An EEG Technician attended to the patient, and was available throughout daytime work hours.  The epilepsy center neurologist was available in person or on call 24-hours per day.  EEG Placement and Labeling of Electrodes: The EEG was performed utilizing 20 channel referential EEG connections (coronal over temporal over parasagittal montage) using all standard 10-20 electrode placements with EKG, with additional electrodes placed in the inferior temporal region using the modified 10-10 montage electrode placements for elective admissions, or if deemed necessary. Recording was at a sampling rate of 256 samples per second per channel. Time synchronized digital video recording was done simultaneously with EEG recording. A low light infrared camera was used for low light recording.   History:  31yo RH man with history of seizure disorder since age 18 admitted to EMU for seizure capture and AED optimization. Patient reports that the last time he had an aura of "sense of moving" was a couple of days ago. Has these auras a few times a month. Reports grand mal seizures in the past but has not had them for several years. Reports he does not always take his AEDs on time. 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 Patient reports he feels his thinking and memory have been affected over time. Was vaccinated with Moderna Covid19 vaccine last 2021.  Home Antiepileptic Medication and Device  Fycompa 6mg qHS, Topamax 100mg bid, Vimpat 150mg bid, Trileptal 600mg bid,  Interpretation:  Day 2 – 	Start: 2021  08:00 	End: 2021  08:00 	Duration: 24 hr  Daily EEG Visual Analysis  FINDINGS:  The background was continuous, spontaneously variable and reactive. During wakefulness, the posterior dominant rhythm consisted of symmetric, well-modulated 10 Hz activity, with amplitude to 30 uV, that attenuated to eye opening.  Low amplitude frontal beta was noted in wakefulness.  Background Slowing: No generalized background slowing was present.  Focal Slowing:  None were present.  Sleep Background: Drowsiness was characterized by fragmentation, attenuation, and slowing of the background activity.   Sleep was characterized by the presence of vertex waves, symmetric sleep spindles and K-complexes.  Other Non-Epileptiform Findings: Two episodes seen at 12:21:44 and 12:29:45 characterized by diffuse muscle activity with right head turn lasting seconds no clear rhythmic activity present  Activation Procedures:  Photic stimulation was not performed. Hyperventilation was performed and did not elicit any abnormalities.    Interictal Epileptiform Activity:  None were present.  Events: At 20:01:12 increase in rhythmic low amplitude theta activity in the right central and midline channels evolving to rapid generalization with sharply contoured theta seen in all channels. Seizure obscured by muscle artifact offset at 21:03:01 followed by diffuse attenuation and tachycardia to greater than 130. Clinically, patient was seen to have left head turn with tonic extension of the bilateral upper extremities followed by clonic jerks of the bilateral upper and lower extremities  Artifacts: Intermittent myogenic and movement artifacts were noted.  ECG: The heart rate on single channel ECG was predominantly between 60-70 BPM.  AEDs:  Held / none  EEG Summary:  Abnormal EEG in the awake, drowsy and asleep states. Right central onset focal to bilateral tonic clonic seizure with postictal tachycardia as described  Impression/Clinical Correlate:  Abnormal EEG  Right central onset focal to bilateral tonic clonic seizure   Calvin Car MD PGY-5 Epilepsy Fellow  Maxwell Vallecillo MD EEG/Epilepsy Attending  Reading Room: [phone] On Call Service After Hours: [phone] ------------------------------------ EEG Reading Room: [phone] On Call Service After Hours: [phone]

## 2021-07-31 NOTE — DISCHARGE NOTE NURSING/CASE MANAGEMENT/SOCIAL WORK - PATIENT PORTAL LINK FT
You can access the FollowMyHealth Patient Portal offered by [facility] by registering at the following website: [email or web address]. By joining [employer]’s FollowMyHealth portal, you will also be able to view your health information using other applications (apps) compatible with our system.

## 2021-07-31 NOTE — DISCHARGE NOTE PROVIDER - NSDCCPCAREPLAN_GEN_ALL_CORE_FT
PRINCIPAL DISCHARGE DIAGNOSIS  Diagnosis: Localization-related (focal) (partial) idiopathic epilepsy and epileptic syndromes with seizures of localized onset, not intractable, without status epilepticus  Assessment and Plan of Treatment: - Continue home medications  Seizure Safety Instructions  1. [facility] law mandates you to self-report your seizure disorder to [facility], and be seizure-free for 1yr before you can drive.   2. Avoid swimming, diving, taking a bath, cooking, use of motarized machineries.  3. Avoid climbing a ladder, trees or any height.  4. Use machines with safety switches.  5. Always be aware of your surroundings and make sure family and friends are aware of your seizures.  6. Use non-breakable dishes.  7. Consider wearing a medical bracelet to inform people you have epilepsy in case of an emergency.

## 2021-07-31 NOTE — DISCHARGE NOTE PROVIDER - NSDCMRMEDTOKEN_GEN_ALL_CORE_FT
cloNIDine 0.3 mg oral tablet: 1 tab(s) orally once a day (at bedtime)  Fycompa 6 mg oral tablet: 1 tab(s) orally once a day (at bedtime)  Topamax 100 mg oral tablet: 1 tab(s) orally 2 times a day  Trileptal 600 mg oral tablet: 1 tab(s) orally 2 times a day  Vimpat 150 mg oral tablet: 1 tab(s) orally 2 times a day

## 2021-08-02 LAB — OXCARBAZEPINE SERPL-MCNC: 12 UG/ML — SIGNIFICANT CHANGE UP (ref 10–35)

## 2021-08-04 LAB
DESMETHYL LACOSAMIDE: 0.7 UG/ML — SIGNIFICANT CHANGE UP
LACOSAMIDE (VIMPAT) RESULT: 2.4 UG/ML — SIGNIFICANT CHANGE UP (ref 1–10)

## 2021-11-24 RX ORDER — OXCARBAZEPINE 300 MG/1
1 TABLET, FILM COATED ORAL
Qty: 0 | Refills: 0 | DISCHARGE

## 2021-11-24 RX ORDER — PERAMPANEL 2 MG/1
1 TABLET ORAL
Qty: 0 | Refills: 0 | DISCHARGE

## 2021-11-24 RX ORDER — LACOSAMIDE 50 MG/1
1 TABLET ORAL
Qty: 0 | Refills: 0 | DISCHARGE

## 2021-11-24 RX ORDER — TOPIRAMATE 25 MG
1 TABLET ORAL
Qty: 0 | Refills: 0 | DISCHARGE

## 2022-01-19 NOTE — DISCHARGE NOTE PROVIDER - NSDCADMDATE_GEN_ALL_CORE_FT
29-Jul-2021 17:34 details… Affect and characteristics of appearance, verbalizations, behaviors are appropriate